# Patient Record
Sex: MALE | Employment: FULL TIME | URBAN - NONMETROPOLITAN AREA
[De-identification: names, ages, dates, MRNs, and addresses within clinical notes are randomized per-mention and may not be internally consistent; named-entity substitution may affect disease eponyms.]

---

## 2021-07-27 DIAGNOSIS — Z00.00 WELL ADULT EXAM: ICD-10-CM

## 2021-07-27 DIAGNOSIS — Z12.5 SCREENING FOR PROSTATE CANCER: ICD-10-CM

## 2021-07-27 LAB
ALBUMIN SERPL-MCNC: 4.4 G/DL (ref 3.5–5.2)
ALP BLD-CCNC: 51 U/L (ref 40–130)
ALT SERPL-CCNC: 22 U/L (ref 5–41)
ANION GAP SERPL CALCULATED.3IONS-SCNC: 10 MMOL/L (ref 7–19)
AST SERPL-CCNC: 19 U/L (ref 5–40)
BASOPHILS ABSOLUTE: 0.1 K/UL (ref 0–0.2)
BASOPHILS RELATIVE PERCENT: 0.6 % (ref 0–1)
BILIRUB SERPL-MCNC: 0.7 MG/DL (ref 0.2–1.2)
BUN BLDV-MCNC: 17 MG/DL (ref 6–20)
CALCIUM SERPL-MCNC: 9.5 MG/DL (ref 8.6–10)
CHLORIDE BLD-SCNC: 99 MMOL/L (ref 98–111)
CHOLESTEROL, FASTING: 159 MG/DL (ref 160–199)
CO2: 29 MMOL/L (ref 22–29)
CREAT SERPL-MCNC: 0.8 MG/DL (ref 0.5–1.2)
EOSINOPHILS ABSOLUTE: 0.5 K/UL (ref 0–0.6)
EOSINOPHILS RELATIVE PERCENT: 5.3 % (ref 0–5)
GFR AFRICAN AMERICAN: >59
GFR NON-AFRICAN AMERICAN: >60
GLUCOSE BLD-MCNC: 91 MG/DL (ref 74–109)
HCT VFR BLD CALC: 45.7 % (ref 42–52)
HDLC SERPL-MCNC: 44 MG/DL (ref 55–121)
HEMOGLOBIN: 15 G/DL (ref 14–18)
IMMATURE GRANULOCYTES #: 0.1 K/UL
LDL CHOLESTEROL CALCULATED: 78 MG/DL
LYMPHOCYTES ABSOLUTE: 2.1 K/UL (ref 1.1–4.5)
LYMPHOCYTES RELATIVE PERCENT: 24.6 % (ref 20–40)
MCH RBC QN AUTO: 29 PG (ref 27–31)
MCHC RBC AUTO-ENTMCNC: 32.8 G/DL (ref 33–37)
MCV RBC AUTO: 88.4 FL (ref 80–94)
MONOCYTES ABSOLUTE: 0.8 K/UL (ref 0–0.9)
MONOCYTES RELATIVE PERCENT: 9.5 % (ref 0–10)
NEUTROPHILS ABSOLUTE: 5 K/UL (ref 1.5–7.5)
NEUTROPHILS RELATIVE PERCENT: 58.6 % (ref 50–65)
PDW BLD-RTO: 13.4 % (ref 11.5–14.5)
PLATELET # BLD: 283 K/UL (ref 130–400)
PMV BLD AUTO: 10.9 FL (ref 9.4–12.4)
POTASSIUM SERPL-SCNC: 4.2 MMOL/L (ref 3.5–5)
PROSTATE SPECIFIC ANTIGEN: 1.29 NG/ML (ref 0–4)
RBC # BLD: 5.17 M/UL (ref 4.7–6.1)
SODIUM BLD-SCNC: 138 MMOL/L (ref 136–145)
T4 FREE: 1.02 NG/DL (ref 0.93–1.7)
TOTAL PROTEIN: 7.2 G/DL (ref 6.6–8.7)
TRIGLYCERIDE, FASTING: 185 MG/DL (ref 0–149)
TSH REFLEX FT4: 7.19 UIU/ML (ref 0.35–5.5)
WBC # BLD: 8.5 K/UL (ref 4.8–10.8)

## 2022-11-21 ENCOUNTER — OFFICE VISIT (OUTPATIENT)
Dept: PRIMARY CARE CLINIC | Age: 57
End: 2022-11-21
Payer: COMMERCIAL

## 2022-11-21 VITALS
TEMPERATURE: 101.7 F | BODY MASS INDEX: 32.16 KG/M2 | WEIGHT: 224.13 LBS | OXYGEN SATURATION: 97 % | DIASTOLIC BLOOD PRESSURE: 76 MMHG | SYSTOLIC BLOOD PRESSURE: 120 MMHG | HEART RATE: 110 BPM

## 2022-11-21 DIAGNOSIS — U07.1 COVID-19: Primary | ICD-10-CM

## 2022-11-21 DIAGNOSIS — R05.9 COUGH, UNSPECIFIED TYPE: ICD-10-CM

## 2022-11-21 LAB
INFLUENZA A ANTIBODY: NORMAL
INFLUENZA B ANTIBODY: NORMAL

## 2022-11-21 PROCEDURE — 99213 OFFICE O/P EST LOW 20 MIN: CPT | Performed by: NURSE PRACTITIONER

## 2022-11-21 PROCEDURE — 87804 INFLUENZA ASSAY W/OPTIC: CPT | Performed by: NURSE PRACTITIONER

## 2022-11-21 RX ORDER — ALBUTEROL SULFATE 90 UG/1
2 AEROSOL, METERED RESPIRATORY (INHALATION) 4 TIMES DAILY PRN
Qty: 18 G | Refills: 0 | Status: SHIPPED | OUTPATIENT
Start: 2022-11-21

## 2022-11-21 RX ORDER — BENZONATATE 100 MG/1
100 CAPSULE ORAL 3 TIMES DAILY PRN
Qty: 21 CAPSULE | Refills: 0 | Status: SHIPPED | OUTPATIENT
Start: 2022-11-21 | End: 2022-11-28

## 2022-11-21 RX ORDER — DEXTROMETHORPHAN HYDROBROMIDE AND PROMETHAZINE HYDROCHLORIDE 15; 6.25 MG/5ML; MG/5ML
5 SYRUP ORAL NIGHTLY
Qty: 35 ML | Refills: 0 | Status: SHIPPED | OUTPATIENT
Start: 2022-11-21 | End: 2022-11-28

## 2022-11-21 ASSESSMENT — ENCOUNTER SYMPTOMS
SHORTNESS OF BREATH: 0
COUGH: 1
EYE DISCHARGE: 0
COLOR CHANGE: 0
BLOOD IN STOOL: 0
CONSTIPATION: 0
RHINORRHEA: 0
WHEEZING: 0
SORE THROAT: 0
ABDOMINAL PAIN: 0
VOMITING: 0
SINUS PRESSURE: 0
EYE ITCHING: 0
NAUSEA: 0
DIARRHEA: 0

## 2022-11-21 NOTE — PATIENT INSTRUCTIONS
COVID-19 Information    What are the symptoms of COVID-19? -- Symptoms usually start 4 or 5 days after a person is infected with the virus. But in some people, it can take up to 2 weeks for symptoms to appear. Some people never show symptoms at all. When symptoms do happen, they can include:  ?Fever  ? Cough  ? Trouble breathing  ? Feeling tired  ? Shaking chills  ? Muscle aches  ? Headache  ? Sore throat  ? Runny or stuffy nose  ? Problems with sense of smell or taste    Some people have digestive problems like nausea or diarrhea. There have also been some reports of rashes or other skin symptoms. For example, some people with COVID-19 get reddish-purple spots on their fingers or toes. But it's not clear why or how often this happens. For most people, symptoms will get better within a few days to weeks. But a small number of people get very sick and stop being able to breathe on their own. In severe cases, their organs stop working, which can lead to death. Some people with COVID-19 continue to have some symptoms for weeks or months. This seems to be more likely in people who are sick enough to need to stay in the hospital. But this can also happen in people who did not get very sick. Doctors are still learning about the long-term effects of COVID-19. While children can get COVID-19, they are less likely than adults to have severe symptoms. How is COVID-19 spread? -- The virus that causes COVID-19 mainly spreads from person to person. This usually happens when an infected person coughs, sneezes, or talks near other people. The virus is passed through tiny particles from the infected person's lungs and airway. These particles can easily travel through the air to other people who are nearby. In some cases, like in indoor spaces where the same air keeps being blown around, virus in the particles might be able to spread to other people who are farther away.  The virus can be passed easily between people who live together. But it can also spread at gatherings where people are talking close together, shaking hands, hugging, sharing food, or even singing together. Eating at restaurants raises the risk of infection, since people tend to be close to each other and not covering their faces. Doctors also think it is possible to get infected if you touch a surface that has the virus on it and then touch your mouth, nose, or eyes. However, this is probably not very common. A person can be infected, and spread the virus to others, even without having any symptoms. Can COVID-19 be prevented? -- The best way to prevent COVID-19 is to get vaccinated. People age 11 and older can get a vaccine. People age 15 and older should also get a \"booster\" shot to give them extra protection. Experts believe that vaccines are one of the most important ways to control the COVID-19 pandemic. People who are fully vaccinated are at much lower risk of getting sick from the virus. If you are not yet vaccinated, there are other ways to help protect yourself and others:  ? Practice \"social distancing. \" It's most important to avoid contact with people who are sick. But social distancing also means staying at least 6 feet (about 2 meters) from anyone outside your household. That's because the virus can spread easily through close contact, and it's not always possible to know who is infected. ?Wear a face mask when you need to go be in public around other people. It might also help protect you from others who could be infected. Make sure your mask covers your mouth and nose. ? Wash your hands with soap and water often. This is especially important after being out in public or touching surfaces that many other people also touch, like door handles or railings. The risk of getting infected by touching items like this is probably not very high. Still, it's a good idea to wash your hands often.  This also helps protect you from other illnesses, like the flu or the common cold. ? Avoid touching your face, especially your mouth, nose, and eyes. What if I feel fine but think I was exposed? -- If you think you were in close contact with someone with COVID-19, what to do next depends on whether you are vaccinated. It also depends on how long ago you got the vaccine and whether you have had a booster shot. Although people who are fully vaccinated are less likely to get sick and infect others, it can still happen. This is why it's important to take steps to lower this risk. First, think about these questions:  ?Have you gotten a booster shot? ?Have you had 2 doses of the Pfizer or Moderna vaccine within the last 6 months? ?Have you had the [employer] Monday and [employer] Monday vaccine within the last 2 months? If you answered \"yes\" to any of the above questions, experts recommend doing the following after being exposed to someone with COVID-19:  ?You do not need to self-quarantine. But you should wear a mask around all other people for 10 days. ?If possible, get tested 5 days after the exposure: If your test is negative, continue to wear a mask around other people until 10 total days have passed  If your test is positive, stay home and \"self-isolate\" for at least 5 days  ? If you start to have symptoms at any point, stay home and get tested again    If you have not been vaccinated at all, or if you answered \"no\" to all of the above questions, experts recommend doing the following:  ?Self-quarantine for 5 days after the exposure. This means staying home and away from other people as much as possible. If you need to be around people, like in your home, wear a mask. ?If possible, get tested 5 days after the exposure: If your test is negative, continue to wear a mask around other people until 10 total days have passed  If your test is positive, continue to stay home and \"self-isolate\" for at least another 5 days  ? If you start to have symptoms at any point, stay home and get tested again  The guidance around what to do after being exposed has changed over time. That's because experts have learned more about the virus and when a person is most likely to infect others. If you are not sure whether you need to self-quarantine, or when you can go back to your normal activities, ask your doctor or nurse. What should I do if someone in my home has COVID-19? -- If someone in your home has COVID-19, there are additional things you can do to protect yourself and others:  ?Keep the sick person away from others - The sick person should stay in a separate room, and use a different bathroom if possible. They should also eat in their own room. Experts also recommend that the person stay away from pets in the house until they are better. ? Have them wear a mask - The sick person should wear a mask when they are in the same room as other people. If they can't wear a mask, you can help protect yourself by covering your face when you are in the room with them. ?Wash hands [employer] your hands with soap and water often. ? Clean often - Here are some specific things that can help:  Wear disposable gloves when you clean. It's also a good idea to wear gloves when you have to touch the sick person's laundry, dishes, utensils, or trash. Wash your hands after removing your gloves. Regularly clean things that are touched a lot. This includes counters, bedside tables, doorknobs, computers, phones, and bathroom surfaces. Clean things in your home with soap and water, but also use disinfectants on appropriate surfaces. Some cleaning products work well to kill bacteria, but not viruses, so it's important to check labels. How is COVID-19 treated? -- Many people will be able to stay home while they get better.  But people with serious symptoms or other health problems might need to go to the hospital.  ?Mild illness - Mild illness means you might have symptoms like fever and cough, but you do not have trouble breathing. Most people with COVID-19 have mild illness and can rest at home until they get better. This usually takes about 2 weeks, but it's not the same for everyone. If you are recovering from COVID-19, it's important to stay home and \"self-isolate\" while you are most likely to spread the virus. Self-isolation means staying apart from other people, even the people you live with. When you can stop self-isolation will depend on how long it has been since you had symptoms. If you are generally healthy and your symptoms are improving (or you don't have symptoms), experts recommend self-isolating for at least 5 days. The 5 days starts the day after you develop symptoms or get tested. After this, you should wear a mask around all other people for 5 more days. If you are not sure how long to self-isolate, or if you still have symptoms after 5 days, talk to your doctor or nurse. You should also check with your doctor or nurse if you have a weakened immune system. If you are at risk for getting seriously ill, doctors might recommend treatment even if you only have mild symptoms. This can lower your risk of getting sicker. Options might include pills that you take for a few days, a treatment called \"monoclonal antibodies\" that is given through an IV or as a shot, and another medicine that is given by IV. Doctors also might recommend being part of a clinical trial. This is a scientific study that tests new medicines to see how well they work. Do not try any new medicines or treatments without talking to a doctor. ? Severe illness - If you have more severe illness with trouble breathing, you might need to stay in the hospital, possibly in the intensive care unit (also called the \"ICU\"). While you are there, you will most likely be in a special isolation room. Only medical staff will be allowed in the room, and they will have to wear special gowns, gloves, masks, and eye protection.   The doctors and nurses can monitor and support your breathing and other body functions and make you as comfortable as possible. You might need extra oxygen to help you breathe easily. If you are having a very hard time breathing, you might need a breathing tube. The tube goes down your throat and into your lungs. It is connected to a machine to help you breathe, called a \"ventilator. \" You might also get medicines that have been shown to help some people with severe COVID-19. COVID Recommendations  Medications such as mucinex, flonase, and claritin may help with symptoms. Use tylenol/motrin as needed for body aches/fevers  To boost your immune system, it is recommended to take Vitamin B, C, D, and zinc.  Zinc  mg daily for 5 days and then decrease to 50 mg a day. Sometimes higher doses may cause nausea. Vitamin C 1000 mg a day  Vitamin D 20 - 25 mcg a day  Take Aspirin 81 mg unless you have an allergy to aspirin or you have stomach issues/bleeding disorders. Delsym is a great OTC cough suppressant. Drink plenty of water to stay hydrated. Get adequate rest. Try lying on your stomach intermittently to help you breathe easier. Get up and move around when you can. Perform deep breathing exercises  Check your oxygen saturation regularly and if below 90%, go to the ER. Go to the ER if you have severe SOA, chest pain, difficulty breathing, or you can not keep your fever below 102F with medications.

## 2022-11-21 NOTE — PROGRESS NOTES
[address] [facility] [address] [address]  Dept: [phone]  Dept Fax: [phone]  Loc: [phone]    Cathleen Khalil is a 56 y.o. male who presents today for his medical conditions/complaints as noted below. Cathleen Khlail is complaining of Congestion (Symptoms started yesterday), Cough, Fever, Generalized Body Aches, and Chills        HPI:   Cough  This is a new problem. The current episode started yesterday. The problem has been waxing and waning. The problem occurs every few minutes. The cough is Non-productive. Associated symptoms include chills, a fever and myalgias. Pertinent negatives include no chest pain, ear pain, headaches, rash, rhinorrhea, sore throat, shortness of breath or wheezing. The symptoms are aggravated by lying down. He has tried nothing for the symptoms. No known COVID or flu exposure recently. At home COVID test was positive    Past Medical History:   Diagnosis Date    Allergic rhinitis        Past Surgical History:   Procedure Laterality Date    BACK SURGERY      FOOT SURGERY Right     HERNIA REPAIR  2010    abdominal     KIDNEY STONE REMOVAL         No family history on file. Social History     Tobacco Use    Smoking status: Never    Smokeless tobacco: Never   Substance Use Topics    Alcohol use: Never        Current Outpatient Medications   Medication Sig Dispense Refill    loratadine (CLARITIN) 10 MG capsule Take 10 mg by mouth daily      promethazine-dextromethorphan (PROMETHAZINE-DM) 6.25-15 MG/5ML syrup Take 5 mLs by mouth nightly for 7 days 35 mL 0    benzonatate (TESSALON) 100 MG capsule Take 1 capsule by mouth 3 times daily as needed for Cough 21 capsule 0    albuterol sulfate HFA (VENTOLIN HFA) 108 (90 Base) MCG/ACT inhaler Inhale 2 puffs into the lungs 4 times daily as needed for Wheezing 18 g 0     No current facility-administered medications for this visit.        No Known Allergies    Health Maintenance   Topic Date Due    COVID-19 Vaccine (1) Never done    HIV screen  Never done    Hepatitis C screen  Never done    DTaP/Tdap/Td vaccine (1 - Tdap) Never done    Colorectal Cancer Screen  Never done    Shingles vaccine (1 of 2) Never done    Depression Screen  07/26/2022    Flu vaccine (1) Never done    Lipids  07/27/2026    Hepatitis A vaccine  Aged Out    Hib vaccine  Aged Out    Meningococcal (ACWY) vaccine  Aged Out    Pneumococcal 0-64 years Vaccine  Aged Out       Subjective:   Review of Systems   Constitutional:  Positive for chills, fatigue and fever. Negative for activity change and appetite change. HENT:  Positive for congestion. Negative for ear pain, rhinorrhea, sinus pressure and sore throat. Eyes:  Negative for discharge and itching. Respiratory:  Positive for cough. Negative for shortness of breath and wheezing. Cardiovascular:  Negative for chest pain. Gastrointestinal:  Negative for abdominal pain, blood in stool, constipation, diarrhea, nausea and vomiting. Musculoskeletal:  Positive for myalgias. Skin:  Negative for color change and rash. Neurological:  Negative for dizziness and headaches. All other systems reviewed and are negative. Objective    Physical Exam  Vitals and nursing note reviewed. Constitutional:       General: He is not in acute distress. Appearance: Normal appearance. HENT:      Head: Normocephalic and atraumatic. Right Ear: Tympanic membrane and ear canal normal.      Left Ear: Tympanic membrane and ear canal normal.      Mouth/Throat:      Mouth: Mucous membranes are moist.      Pharynx: No posterior oropharyngeal erythema. Comments: Post nasal drip noted    Eyes:      Extraocular Movements: Extraocular movements intact. Pupils: Pupils are equal, round, and reactive to light. Cardiovascular:      Rate and Rhythm: Normal rate and regular rhythm. Pulses: Normal pulses. Heart sounds: Normal heart sounds.  No murmur heard.  Pulmonary:      Effort: Pulmonary effort is normal. No respiratory distress. Breath sounds: Wheezing (intermittent, mild) present. Skin:     General: Skin is warm. Capillary Refill: Capillary refill takes less than 2 seconds. Coloration: Skin is not pale. Findings: No rash. Neurological:      General: No focal deficit present. Mental Status: He is alert and oriented to person, place, and time. Deep Tendon Reflexes: Reflexes are normal and symmetric. Psychiatric:         Attention and Perception: Attention normal.         Mood and Affect: Mood normal.         Behavior: Behavior normal. Behavior is cooperative. Thought Content: Thought content normal.       /76   Pulse (!) 110   Temp (!) 101.7 °F (38.7 °C)   Wt 224 lb 2 oz (101.7 kg)   SpO2 97%   BMI 32.16 kg/m²     Assessment         Diagnosis Orders   1. COVID-19        2. Cough, unspecified type  POCT Influenza A/B          Plan   CDC quarantine guidelines discussed  Medications such as zyrtec or claritin may help with symptoms. Use benzonatate as needed for cough during the day and promethazine-DM as needed at night  Use albuterol inhaler as needed for wheezing  Use tylenol/motrin as needed for body aches/fevers unless contraindicated  Multivitamin is recommended to help boost the immune system  Drink plenty of water to stay hydrated. May use humidifier as needed. Allow adequate rest.  Encourage deep breathing exercises  Go to the ER if you develop severe SOA, chest pain, difficulty breathing, decreased urine output or signs of dehydration, severe abdominal pain or vomiting, or you can not keep the fever below 102F with medications.     Orders Placed This Encounter   Procedures    POCT Influenza A/B       Results for orders placed or performed in visit on 11/21/22   POCT Influenza A/B   Result Value Ref Range    Influenza A Ab neg     Influenza B Ab neg        Orders Placed This Encounter Medications    promethazine-dextromethorphan (PROMETHAZINE-DM) 6.25-15 MG/5ML syrup     Sig: Take 5 mLs by mouth nightly for 7 days     Dispense:  35 mL     Refill:  0    benzonatate (TESSALON) 100 MG capsule     Sig: Take 1 capsule by mouth 3 times daily as needed for Cough     Dispense:  21 capsule     Refill:  0    albuterol sulfate HFA (VENTOLIN HFA) 108 (90 Base) MCG/ACT inhaler     Sig: Inhale 2 puffs into the lungs 4 times daily as needed for Wheezing     Dispense:  18 g     Refill:  0      New Prescriptions    ALBUTEROL SULFATE HFA (VENTOLIN HFA) 108 (90 BASE) MCG/ACT INHALER    Inhale 2 puffs into the lungs 4 times daily as needed for Wheezing    BENZONATATE (TESSALON) 100 MG CAPSULE    Take 1 capsule by mouth 3 times daily as needed for Cough    PROMETHAZINE-DEXTROMETHORPHAN (PROMETHAZINE-DM) 6.25-15 MG/5ML SYRUP    Take 5 mLs by mouth nightly for 7 days        Return if symptoms worsen or fail to improve. Discussed use, benefits, and side effects of any prescribed medications. All patient questions were answered. Patient voiced understanding of care plan. Patient was given educational materials - see patient instructions below. Patient Instructions   COVID-19 Information    What are the symptoms of COVID-19? -- Symptoms usually start 4 or 5 days after a person is infected with the virus. But in some people, it can take up to 2 weeks for symptoms to appear. Some people never show symptoms at all. When symptoms do happen, they can include:  ?Fever  ? Cough  ? Trouble breathing  ? Feeling tired  ? Shaking chills  ? Muscle aches  ? Headache  ? Sore throat  ? Runny or stuffy nose  ? Problems with sense of smell or taste    Some people have digestive problems like nausea or diarrhea. There have also been some reports of rashes or other skin symptoms. For example, some people with COVID-19 get reddish-purple spots on their fingers or toes. But it's not clear why or how often this happens.  For most people, symptoms will get better within a few days to weeks. But a small number of people get very sick and stop being able to breathe on their own. In severe cases, their organs stop working, which can lead to death. Some people with COVID-19 continue to have some symptoms for weeks or months. This seems to be more likely in people who are sick enough to need to stay in the hospital. But this can also happen in people who did not get very sick. Doctors are still learning about the long-term effects of COVID-19. While children can get COVID-19, they are less likely than adults to have severe symptoms. How is COVID-19 spread? -- The virus that causes COVID-19 mainly spreads from person to person. This usually happens when an infected person coughs, sneezes, or talks near other people. The virus is passed through tiny particles from the infected person's lungs and airway. These particles can easily travel through the air to other people who are nearby. In some cases, like in indoor spaces where the same air keeps being blown around, virus in the particles might be able to spread to other people who are farther away. The virus can be passed easily between people who live together. But it can also spread at gatherings where people are talking close together, shaking hands, hugging, sharing food, or even singing together. Eating at restaurants raises the risk of infection, since people tend to be close to each other and not covering their faces. Doctors also think it is possible to get infected if you touch a surface that has the virus on it and then touch your mouth, nose, or eyes. However, this is probably not very common. A person can be infected, and spread the virus to others, even without having any symptoms. Can COVID-19 be prevented? -- The best way to prevent COVID-19 is to get vaccinated. People age 11 and older can get a vaccine.  People age 15 and older should also get a \"booster\" shot to give them extra protection. Experts believe that vaccines are one of the most important ways to control the COVID-19 pandemic. People who are fully vaccinated are at much lower risk of getting sick from the virus. If you are not yet vaccinated, there are other ways to help protect yourself and others:  ? Practice \"social distancing. \" It's most important to avoid contact with people who are sick. But social distancing also means staying at least 6 feet (about 2 meters) from anyone outside your household. That's because the virus can spread easily through close contact, and it's not always possible to know who is infected. ?Wear a face mask when you need to go be in public around other people. It might also help protect you from others who could be infected. Make sure your mask covers your mouth and nose. ? Wash your hands with soap and water often. This is especially important after being out in public or touching surfaces that many other people also touch, like door handles or railings. The risk of getting infected by touching items like this is probably not very high. Still, it's a good idea to wash your hands often. This also helps protect you from other illnesses, like the flu or the common cold. ? Avoid touching your face, especially your mouth, nose, and eyes. What if I feel fine but think I was exposed? -- If you think you were in close contact with someone with COVID-19, what to do next depends on whether you are vaccinated. It also depends on how long ago you got the vaccine and whether you have had a booster shot. Although people who are fully vaccinated are less likely to get sick and infect others, it can still happen. This is why it's important to take steps to lower this risk. First, think about these questions:  ?Have you gotten a booster shot? ?Have you had 2 doses of the Pfizer or Moderna vaccine within the last 6 months? ?Have you had the Sean Minus and Sean Minus vaccine within the last 2 months?     If you answered \"yes\" to any of the above questions, experts recommend doing the following after being exposed to someone with COVID-19:  ?You do not need to self-quarantine. But you should wear a mask around all other people for 10 days. ?If possible, get tested 5 days after the exposure: If your test is negative, continue to wear a mask around other people until 10 total days have passed  If your test is positive, stay home and \"self-isolate\" for at least 5 days  ? If you start to have symptoms at any point, stay home and get tested again    If you have not been vaccinated at all, or if you answered \"no\" to all of the above questions, experts recommend doing the following:  ?Self-quarantine for 5 days after the exposure. This means staying home and away from other people as much as possible. If you need to be around people, like in your home, wear a mask. ?If possible, get tested 5 days after the exposure: If your test is negative, continue to wear a mask around other people until 10 total days have passed  If your test is positive, continue to stay home and \"self-isolate\" for at least another 5 days  ? If you start to have symptoms at any point, stay home and get tested again  The guidance around what to do after being exposed has changed over time. That's because experts have learned more about the virus and when a person is most likely to infect others. If you are not sure whether you need to self-quarantine, or when you can go back to your normal activities, ask your doctor or nurse. What should I do if someone in my home has COVID-19? -- If someone in your home has COVID-19, there are additional things you can do to protect yourself and others:  ?Keep the sick person away from others - The sick person should stay in a separate room, and use a different bathroom if possible. They should also eat in their own room.   Experts also recommend that the person stay away from pets in the house until they are better. ? Have them wear a mask - The sick person should wear a mask when they are in the same room as other people. If they can't wear a mask, you can help protect yourself by covering your face when you are in the room with them. ?Wash hands [employer] your hands with soap and water often. ? Clean often - Here are some specific things that can help:  Wear disposable gloves when you clean. It's also a good idea to wear gloves when you have to touch the sick person's laundry, dishes, utensils, or trash. Wash your hands after removing your gloves. Regularly clean things that are touched a lot. This includes counters, bedside tables, doorknobs, computers, phones, and bathroom surfaces. Clean things in your home with soap and water, but also use disinfectants on appropriate surfaces. Some cleaning products work well to kill bacteria, but not viruses, so it's important to check labels. How is COVID-19 treated? -- Many people will be able to stay home while they get better. But people with serious symptoms or other health problems might need to go to the hospital.  ?Mild illness - Mild illness means you might have symptoms like fever and cough, but you do not have trouble breathing. Most people with COVID-19 have mild illness and can rest at home until they get better. This usually takes about 2 weeks, but it's not the same for everyone. If you are recovering from COVID-19, it's important to stay home and \"self-isolate\" while you are most likely to spread the virus. Self-isolation means staying apart from other people, even the people you live with. When you can stop self-isolation will depend on how long it has been since you had symptoms. If you are generally healthy and your symptoms are improving (or you don't have symptoms), experts recommend self-isolating for at least 5 days. The 5 days starts the day after you develop symptoms or get tested.  After this, you should wear a mask around all other people for 5 more days. If you are not sure how long to self-isolate, or if you still have symptoms after 5 days, talk to your doctor or nurse. You should also check with your doctor or nurse if you have a weakened immune system. If you are at risk for getting seriously ill, doctors might recommend treatment even if you only have mild symptoms. This can lower your risk of getting sicker. Options might include pills that you take for a few days, a treatment called \"monoclonal antibodies\" that is given through an IV or as a shot, and another medicine that is given by IV. Doctors also might recommend being part of a clinical trial. This is a scientific study that tests new medicines to see how well they work. Do not try any new medicines or treatments without talking to a doctor. ? Severe illness - If you have more severe illness with trouble breathing, you might need to stay in the hospital, possibly in the intensive care unit (also called the \"ICU\"). While you are there, you will most likely be in a special isolation room. Only medical staff will be allowed in the room, and they will have to wear special gowns, gloves, masks, and eye protection. The doctors and nurses can monitor and support your breathing and other body functions and make you as comfortable as possible. You might need extra oxygen to help you breathe easily. If you are having a very hard time breathing, you might need a breathing tube. The tube goes down your throat and into your lungs. It is connected to a machine to help you breathe, called a \"ventilator. \" You might also get medicines that have been shown to help some people with severe COVID-19. COVID Recommendations  Medications such as mucinex, flonase, and claritin may help with symptoms. Use tylenol/motrin as needed for body aches/fevers  To boost your immune system, it is recommended to take Vitamin B, C, D, and zinc.  Zinc  mg daily for 5 days and then decrease to 50 mg a day.  Sometimes higher doses may cause nausea. Vitamin C 1000 mg a day  Vitamin D 20 - 25 mcg a day  Take Aspirin 81 mg unless you have an allergy to aspirin or you have stomach issues/bleeding disorders. Delsym is a great OTC cough suppressant. Drink plenty of water to stay hydrated. Get adequate rest. Try lying on your stomach intermittently to help you breathe easier. Get up and move around when you can. Perform deep breathing exercises  Check your oxygen saturation regularly and if below 90%, go to the ER. Go to the ER if you have severe SOA, chest pain, difficulty breathing, or you can not keep your fever below 102F with medications.          Electronically signed by MIKE Merlos CNP on 11/21/2022 at 1:53 PM

## 2022-11-21 NOTE — LETTER
[facility] ([facility]) [facility] [address]  Phone: [phone]  Fax: [phone]    MIKE Joy CNP        November 21, 2022     Patient: Mauri Blevins   YOB: 1965   Date of Visit: 11/21/2022       To Whom it May Concern: Mauri Blevins was seen in my clinic on 11/21/2022. He may return to work on 11/28/2022. If you have any questions or concerns, please don't hesitate to call.     Sincerely,         MIKE Joy CNP

## 2022-12-30 ENCOUNTER — OFFICE VISIT (OUTPATIENT)
Dept: PRIMARY CARE CLINIC | Age: 57
End: 2022-12-30

## 2022-12-30 VITALS
HEART RATE: 72 BPM | SYSTOLIC BLOOD PRESSURE: 116 MMHG | DIASTOLIC BLOOD PRESSURE: 72 MMHG | RESPIRATION RATE: 22 BRPM | WEIGHT: 228 LBS | OXYGEN SATURATION: 95 % | TEMPERATURE: 98.1 F | BODY MASS INDEX: 32.71 KG/M2

## 2022-12-30 DIAGNOSIS — R06.02 SOB (SHORTNESS OF BREATH): ICD-10-CM

## 2022-12-30 DIAGNOSIS — R06.2 WHEEZING: ICD-10-CM

## 2022-12-30 DIAGNOSIS — J06.9 URI WITH COUGH AND CONGESTION: Primary | ICD-10-CM

## 2022-12-30 RX ORDER — AZITHROMYCIN 250 MG/1
250 TABLET, FILM COATED ORAL SEE ADMIN INSTRUCTIONS
Qty: 6 TABLET | Refills: 0 | Status: SHIPPED | OUTPATIENT
Start: 2022-12-30 | End: 2023-01-04

## 2022-12-30 RX ORDER — METHYLPREDNISOLONE 4 MG/1
TABLET ORAL
Qty: 1 KIT | Refills: 0 | Status: SHIPPED | OUTPATIENT
Start: 2022-12-30

## 2022-12-30 RX ORDER — DEXAMETHASONE SODIUM PHOSPHATE 10 MG/ML
10 INJECTION INTRAMUSCULAR; INTRAVENOUS ONCE
Status: COMPLETED | OUTPATIENT
Start: 2022-12-30 | End: 2022-12-30

## 2022-12-30 RX ORDER — AMOXICILLIN AND CLAVULANATE POTASSIUM 875; 125 MG/1; MG/1
1 TABLET, FILM COATED ORAL 2 TIMES DAILY
Qty: 20 TABLET | Refills: 0 | Status: SHIPPED | OUTPATIENT
Start: 2022-12-30 | End: 2023-01-09

## 2022-12-30 RX ADMIN — DEXAMETHASONE SODIUM PHOSPHATE 10 MG: 10 INJECTION INTRAMUSCULAR; INTRAVENOUS at 17:40

## 2022-12-30 ASSESSMENT — ENCOUNTER SYMPTOMS
SINUS PAIN: 1
ALLERGIC/IMMUNOLOGIC NEGATIVE: 1
GASTROINTESTINAL NEGATIVE: 1
COUGH: 1
SINUS PRESSURE: 1
EYES NEGATIVE: 1
SHORTNESS OF BREATH: 1
WHEEZING: 1

## 2022-12-30 NOTE — PROGRESS NOTES
Rc Geller (:  1965) is a 56 y.o. male,Established patient, here for evaluation of the following chief complaint(s):  Chest Congestion (Chest congestion for 2 weeks . Had Covid Thanksgiving . Felt good for about 1 week . Now wheezing in chest , cough congestion ) and Nasal Congestion    Patient presents today complaining of cough, chest congestion, wheezing, shortness of breath, sinus congestion. Patient was positive for COVID-19 on 2022. He [facility] he felt better for about 1 week, but then started coughing again and in the past 2 days has developed the wheezing and shortness of breath. Denies fever, body aches or chills, GI symptoms. He takes Claritin daily, and has been using his inhaler, and taking Mucinex. He states he has Flonase nasal spray at home. Discussed with patient that due to the amount of wheezing and his shortness of breath I am concerned about pneumonia. Chest x-ray ordered. We will treat with Augmentin and azithromycin to cover for pneumonia. He will receive a steroid injection in office and is to start a steroid pack tomorrow. He is to continue taking Mucinex, Claritin and start Flonase. Discussed with patient the need to go to the emergency department if he becomes extremely short of breath. Care instructions discussed. Patient verbalized understanding and agrees to plan of care. ASSESSMENT/PLAN:  1. URI with cough and congestion  -     azithromycin (ZITHROMAX) 250 MG tablet; Take 1 tablet by mouth See Admin Instructions for 5 days 500mg on day 1 followed by 250mg on days 2 - 5, Disp-6 tablet, R-0Normal  -     XR CHEST STANDARD (2 VW); Future  2. Wheezing  -     XR CHEST STANDARD (2 VW); Future  3. SOB (shortness of breath)  -     XR CHEST STANDARD (2 VW);  Future   Orders Placed This Encounter   Medications    dexamethasone (DECADRON) injection 10 mg    amoxicillin-clavulanate (AUGMENTIN) 875-125 MG per tablet     Sig: Take 1 tablet by mouth 2 times daily for 10 days     Dispense:  20 tablet     Refill:  0    azithromycin (ZITHROMAX) 250 MG tablet     Sig: Take 1 tablet by mouth See Admin Instructions for 5 days 500mg on day 1 followed by 250mg on days 2 - 5     Dispense:  6 tablet     Refill:  0    methylPREDNISolone (MEDROL DOSEPACK) 4 MG tablet     Sig: Take by mouth. Dispense:  1 kit     Refill:  0        Return if symptoms worsen or fail to improve. Subjective   SUBJECTIVE/OBJECTIVE:  Chest Congestion  Associated symptoms include congestion and coughing. Review of Systems   Constitutional: Negative. HENT:  Positive for congestion, postnasal drip, sinus pressure and sinus pain. Eyes: Negative. Respiratory:  Positive for cough, shortness of breath and wheezing. Cardiovascular: Negative. Gastrointestinal: Negative. Endocrine: Negative. Genitourinary: Negative. Musculoskeletal: Negative. Skin: Negative. Allergic/Immunologic: Negative. Neurological: Negative. Hematological: Negative. Psychiatric/Behavioral: Negative. Objective   Physical Exam  Vitals reviewed. HENT:      Right Ear: Tympanic membrane, ear canal and external ear normal.      Left Ear: Tympanic membrane, ear canal and external ear normal.      Nose:      Right Sinus: No maxillary sinus tenderness or frontal sinus tenderness. Left Sinus: No maxillary sinus tenderness or frontal sinus tenderness. Mouth/Throat:      Lips: Pink. Mouth: Mucous membranes are moist.      Pharynx: Posterior oropharyngeal erythema (postnasal drainage) present. No oropharyngeal exudate. Cardiovascular:      Rate and Rhythm: Normal rate and regular rhythm. Heart sounds: Normal heart sounds. Pulmonary:      Effort: Pulmonary effort is normal.      Breath sounds: Examination of the right-upper field reveals wheezing and rhonchi. Examination of the left-upper field reveals wheezing.  Examination of the right-middle field reveals wheezing. Examination of the left-middle field reveals wheezing. Examination of the right-lower field reveals wheezing. Examination of the left-lower field reveals wheezing. Wheezing and rhonchi present. Comments: Patient can be heard wheezing while sitting in room. Lymphadenopathy:      Head:      Right side of head: No submandibular or tonsillar adenopathy. Left side of head: No submandibular or tonsillar adenopathy. Cervical:      Right cervical: No superficial cervical adenopathy. Left cervical: No superficial cervical adenopathy. Skin:     General: Skin is warm and dry. Neurological:      Mental Status: He is alert. Patient Instructions     Completely finish both antibiotics. Start steroid pack tomorrow. Continue Mucinex for congestion. Continue Claritin daily. Start Flonase nasal spray daily. Cool-mist humidifier in bedroom. Use your inhaler as needed for wheezing or shortness of breath. If you develop severe shortness of breath go to the emergency department for evaluation and treatment. You will receive a phone call regarding your chest x-ray results. If a different treatment is needed you will be notified at that time. Follow-up with your PCP if symptoms persist or worsen. An electronic signature was used to authenticate this note. --MIKE Lux - CNP     EMR Dragon/translation disclaimer: Much of this encounter note is an electronic transcription/translation of spoken language to printed text. The electronic translation of spoken language may be erroneous, or at times, nonsensical words or phrases may be inadvertently transcribed.   Although I have reviewed the note for such errors, some may still exist.

## 2022-12-30 NOTE — PATIENT INSTRUCTIONS
Completely finish both antibiotics. Start steroid pack tomorrow. Continue Mucinex for congestion. Continue Claritin daily. Start Flonase nasal spray daily. Cool-mist humidifier in bedroom. Use your inhaler as needed for wheezing or shortness of breath. If you develop severe shortness of breath go to the emergency department for evaluation and treatment. You will receive a phone call regarding your chest x-ray results. If a different treatment is needed you will be notified at that time. Follow-up with your PCP if symptoms persist or worsen.

## 2023-01-30 ENCOUNTER — OFFICE VISIT (OUTPATIENT)
Dept: PRIMARY CARE CLINIC | Age: 58
End: 2023-01-30
Payer: COMMERCIAL

## 2023-01-30 VITALS
SYSTOLIC BLOOD PRESSURE: 102 MMHG | OXYGEN SATURATION: 100 % | HEART RATE: 74 BPM | TEMPERATURE: 98 F | WEIGHT: 226.8 LBS | BODY MASS INDEX: 32.54 KG/M2 | DIASTOLIC BLOOD PRESSURE: 70 MMHG

## 2023-01-30 DIAGNOSIS — J01.00 ACUTE NON-RECURRENT MAXILLARY SINUSITIS: Primary | ICD-10-CM

## 2023-01-30 PROCEDURE — 99213 OFFICE O/P EST LOW 20 MIN: CPT | Performed by: NURSE PRACTITIONER

## 2023-01-30 PROCEDURE — 96372 THER/PROPH/DIAG INJ SC/IM: CPT | Performed by: NURSE PRACTITIONER

## 2023-01-30 RX ORDER — DEXAMETHASONE SODIUM PHOSPHATE 10 MG/ML
10 INJECTION INTRAMUSCULAR; INTRAVENOUS ONCE
Status: COMPLETED | OUTPATIENT
Start: 2023-01-30 | End: 2023-01-30

## 2023-01-30 RX ORDER — BENZONATATE 200 MG/1
200 CAPSULE ORAL 3 TIMES DAILY PRN
Qty: 30 CAPSULE | Refills: 0 | Status: SHIPPED | OUTPATIENT
Start: 2023-01-30 | End: 2023-02-06

## 2023-01-30 RX ORDER — CEFDINIR 300 MG/1
300 CAPSULE ORAL 2 TIMES DAILY
Qty: 20 CAPSULE | Refills: 0 | Status: SHIPPED | OUTPATIENT
Start: 2023-01-30 | End: 2023-02-09

## 2023-01-30 RX ORDER — METHYLPREDNISOLONE 4 MG/1
TABLET ORAL
Qty: 1 KIT | Refills: 0 | Status: SHIPPED | OUTPATIENT
Start: 2023-01-30

## 2023-01-30 RX ADMIN — DEXAMETHASONE SODIUM PHOSPHATE 10 MG: 10 INJECTION INTRAMUSCULAR; INTRAVENOUS at 14:06

## 2023-01-30 ASSESSMENT — ENCOUNTER SYMPTOMS
CONSTIPATION: 0
NAUSEA: 0
VOMITING: 0
SINUS PRESSURE: 1
BLOOD IN STOOL: 0
WHEEZING: 1
COLOR CHANGE: 0
DIARRHEA: 0
EYE ITCHING: 0
SHORTNESS OF BREATH: 0
SORE THROAT: 0
COUGH: 1
RHINORRHEA: 0
EYE DISCHARGE: 0
ABDOMINAL PAIN: 0

## 2023-01-30 NOTE — PROGRESS NOTES
[facility]  [facility] [address]  [address]  [place] [address]  Dept: [phone]  Dept Fax: [phone]  Loc: [phone]    Skip Wei is a 57 y.o. male who presents today for his medical conditions/complaints as noted below.  Skip Wei is complaining of Cough (Symptoms started about a week ago and getting worse) and Congestion (Chest and nasal)        HPI:   Cough  This is a new problem. The current episode started in the past 7 days. The problem has been gradually worsening. The problem occurs hourly. The cough is Non-productive. Associated symptoms include nasal congestion and wheezing (at night). Pertinent negatives include no chest pain, chills, ear pain, fever, headaches, myalgias, rash, rhinorrhea, sore throat or shortness of breath. Nothing aggravates the symptoms. Treatments tried: claritin. The treatment provided mild relief.   No known COVID or flu exposure recently    Past Medical History:   Diagnosis Date    Allergic rhinitis        Past Surgical History:   Procedure Laterality Date    BACK SURGERY      FOOT SURGERY Right     HERNIA REPAIR  2010    abdominal     KIDNEY STONE REMOVAL         No family history on file.    Social History     Tobacco Use    Smoking status: Never    Smokeless tobacco: Never   Substance Use Topics    Alcohol use: Never        Current Outpatient Medications   Medication Sig Dispense Refill    methylPREDNISolone (MEDROL DOSEPACK) 4 MG tablet Take by mouth. 1 kit 0    cefdinir (OMNICEF) 300 MG capsule Take 1 capsule by mouth 2 times daily for 10 days 20 capsule 0    benzonatate (TESSALON) 200 MG capsule Take 1 capsule by mouth 3 times daily as needed for Cough 30 capsule 0    albuterol sulfate HFA (VENTOLIN HFA) 108 (90 Base) MCG/ACT inhaler Inhale 2 puffs into the lungs 4 times daily as needed for Wheezing 18 g 0    loratadine (CLARITIN) 10 MG capsule Take 10 mg by mouth daily       Current Facility-Administered  Medications   Medication Dose Route Frequency Provider Last Rate Last Admin    dexamethasone (DECADRON) injection 10 mg  10 mg IntraMUSCular Once Oskar Lilaine, APRN - CNP           No Known Allergies    Health Maintenance   Topic Date Due    COVID-19 Vaccine (1) Never done    HIV screen  Never done    Hepatitis C screen  Never done    DTaP/Tdap/Td vaccine (1 - Tdap) Never done    Colorectal Cancer Screen  Never done    Shingles vaccine (1 of 2) Never done    Depression Screen  07/26/2022    Flu vaccine (1) Never done    Lipids  07/27/2026    Hepatitis A vaccine  Aged Out    Hib vaccine  Aged Out    Meningococcal (ACWY) vaccine  Aged Out    Pneumococcal 0-64 years Vaccine  Aged Out       Subjective:   Review of Systems   Constitutional:  Negative for activity change, appetite change, chills, fatigue and fever. HENT:  Positive for congestion and sinus pressure. Negative for ear pain, rhinorrhea and sore throat. Eyes:  Negative for discharge and itching. Respiratory:  Positive for cough and wheezing (at night). Negative for shortness of breath. Cardiovascular:  Negative for chest pain. Gastrointestinal:  Negative for abdominal pain, blood in stool, constipation, diarrhea, nausea and vomiting. Musculoskeletal:  Negative for myalgias. Skin:  Negative for color change and rash. Neurological:  Negative for dizziness and headaches. All other systems reviewed and are negative. Objective    Physical Exam  Constitutional:       General: He is not in acute distress. Appearance: Normal appearance. He is not diaphoretic. HENT:      Head: Normocephalic and atraumatic. Right Ear: Ear canal normal. A middle ear effusion is present. Left Ear: Ear canal normal. A middle ear effusion is present. Nose: Congestion present. Right Turbinates: Swollen. Left Turbinates: Swollen. Right Sinus: Maxillary sinus tenderness present. Left Sinus: Maxillary sinus tenderness present. Mouth/Throat:      Mouth: Mucous membranes are moist.      Pharynx: No posterior oropharyngeal erythema. Comments: Post nasal drip noted  Eyes:      Extraocular Movements: Extraocular movements intact. Pupils: Pupils are equal, round, and reactive to light. Cardiovascular:      Rate and Rhythm: Normal rate and regular rhythm. Pulses: Normal pulses. Heart sounds: No murmur heard. Pulmonary:      Effort: Pulmonary effort is normal. No respiratory distress. Breath sounds: Normal breath sounds. No stridor. No wheezing, rhonchi or rales. Skin:     General: Skin is warm and dry. Capillary Refill: Capillary refill takes less than 2 seconds. Coloration: Skin is not jaundiced. Findings: No bruising, erythema or rash. Neurological:      General: No focal deficit present. Mental Status: He is alert and oriented to person, place, and time. Psychiatric:         Mood and Affect: Mood normal.         Behavior: Behavior normal. Behavior is cooperative. Thought Content: Thought content normal.       /70 (Site: Right Upper Arm, Position: Sitting, Cuff Size: Medium Adult)   Pulse 74   Temp 98 °F (36.7 °C) (Temporal)   Wt 226 lb 12.8 oz (102.9 kg)   SpO2 100%   BMI 32.54 kg/m²     Assessment         Diagnosis Orders   1. Acute non-recurrent maxillary sinusitis            Plan   - Take full course of antibiotics  - Take medrol as directed beginning tomorrow  - Dexamethasone injection today in office  - Increase fluid intake  - Recommended OTC mucinex   - May use OTC claritin/zyrtec and nasal spray such as flonase to help symptoms  - If patient is not improving or developing any new/worsening symptoms then return to clinic or f/u with PCP    Orders Placed This Encounter   Medications    methylPREDNISolone (MEDROL DOSEPACK) 4 MG tablet     Sig: Take by mouth.      Dispense:  1 kit     Refill:  0    cefdinir (OMNICEF) 300 MG capsule     Sig: Take 1 capsule by mouth 2 times daily for 10 days     Dispense:  20 capsule     Refill:  0    benzonatate (TESSALON) 200 MG capsule     Sig: Take 1 capsule by mouth 3 times daily as needed for Cough     Dispense:  30 capsule     Refill:  0    dexamethasone (DECADRON) injection 10 mg        New Prescriptions    BENZONATATE (TESSALON) 200 MG CAPSULE    Take 1 capsule by mouth 3 times daily as needed for Cough    CEFDINIR (OMNICEF) 300 MG CAPSULE    Take 1 capsule by mouth 2 times daily for 10 days    METHYLPREDNISOLONE (MEDROL DOSEPACK) 4 MG TABLET    Take by mouth. Return if symptoms worsen or fail to improve. Discussed use, benefits, and side effects of any prescribed medications. All patient questions were answered. Patient voiced understanding of care plan. Patient was given educational materials - see patient instructions below.      Patient Instructions   - Take full course of antibiotics  - Take medrol as directed  - Increase fluid intake  - Recommended OTC mucinex   - May use OTC claritin/zyrtec and nasal spray such as flonase to help symptoms  - If patient is not improving or developing any new/worsening symptoms then return to clinic or f/u with PCP       Electronically signed by MIKE Hogue CNP on 1/30/2023 at 1:55 PM

## 2023-01-30 NOTE — PATIENT INSTRUCTIONS
- Take full course of antibiotics  - Take medrol as directed  - Increase fluid intake  - Recommended OTC mucinex   - May use OTC claritin/zyrtec and nasal spray such as flonase to help symptoms  - If patient is not improving or developing any new/worsening symptoms then return to clinic or f/u with PCP

## 2023-01-30 NOTE — LETTER
[facility] ([facility]) [facility] [place] [address] [address] [place] [address]  Phone: [phone]  Fax: [phone]    MIKE Saucedo CNP        January 30, 2023     Patient: Jackie Krishnan   YOB: 1965   Date of Visit: 1/30/2023       To Whom it May Concern: Jackie Krishnan was seen in my clinic on 1/30/2023. He may return to work on 1/31/2023. If you have any questions or concerns, please don't hesitate to call.     Sincerely,         MIKE Saucedo CNP

## 2023-08-17 ENCOUNTER — OFFICE VISIT (OUTPATIENT)
Dept: FAMILY MEDICINE CLINIC | Age: 58
End: 2023-08-17
Payer: COMMERCIAL

## 2023-08-17 VITALS
BODY MASS INDEX: 29.56 KG/M2 | OXYGEN SATURATION: 97 % | DIASTOLIC BLOOD PRESSURE: 82 MMHG | WEIGHT: 206 LBS | TEMPERATURE: 98.4 F | SYSTOLIC BLOOD PRESSURE: 126 MMHG | HEART RATE: 59 BPM | RESPIRATION RATE: 17 BRPM

## 2023-08-17 DIAGNOSIS — R10.13 EPIGASTRIC PAIN: Primary | ICD-10-CM

## 2023-08-17 DIAGNOSIS — K27.9 PUD (PEPTIC ULCER DISEASE): ICD-10-CM

## 2023-08-17 DIAGNOSIS — R11.2 INTRACTABLE NAUSEA AND VOMITING: ICD-10-CM

## 2023-08-17 PROCEDURE — 99214 OFFICE O/P EST MOD 30 MIN: CPT | Performed by: CLINICAL NURSE SPECIALIST

## 2023-08-17 RX ORDER — SUCRALFATE 1 G/1
1 TABLET ORAL 4 TIMES DAILY
Qty: 120 TABLET | Refills: 0 | Status: SHIPPED | OUTPATIENT
Start: 2023-08-17

## 2023-08-17 RX ORDER — ONDANSETRON 4 MG/1
4 TABLET, ORALLY DISINTEGRATING ORAL 3 TIMES DAILY PRN
Qty: 30 TABLET | Refills: 0 | Status: SHIPPED | OUTPATIENT
Start: 2023-08-17

## 2023-08-17 RX ORDER — PANTOPRAZOLE SODIUM 40 MG/1
40 TABLET, DELAYED RELEASE ORAL
Qty: 90 TABLET | Refills: 0 | Status: SHIPPED | OUTPATIENT
Start: 2023-08-17

## 2023-08-17 ASSESSMENT — ENCOUNTER SYMPTOMS
VOMITING: 1
TROUBLE SWALLOWING: 0
WHEEZING: 0
DIARRHEA: 0
ABDOMINAL PAIN: 1
COLOR CHANGE: 0
SHORTNESS OF BREATH: 0
NAUSEA: 1
ANAL BLEEDING: 0
CHEST TIGHTNESS: 0
SORE THROAT: 0
COUGH: 0
BLOOD IN STOOL: 0
CONSTIPATION: 0
EYE DISCHARGE: 0
BACK PAIN: 0
SINUS PRESSURE: 0
EYE PAIN: 0
ABDOMINAL DISTENTION: 0

## 2023-08-17 ASSESSMENT — PATIENT HEALTH QUESTIONNAIRE - PHQ9
SUM OF ALL RESPONSES TO PHQ9 QUESTIONS 1 & 2: 2
2. FEELING DOWN, DEPRESSED OR HOPELESS: 1
SUM OF ALL RESPONSES TO PHQ QUESTIONS 1-9: 2
1. LITTLE INTEREST OR PLEASURE IN DOING THINGS: 1

## 2023-08-17 NOTE — PROGRESS NOTES
There is abdominal tenderness in the right upper quadrant, epigastric area and left upper quadrant. There is no rebound. Genitourinary:     Penis: No tenderness. Musculoskeletal:         General: No deformity. Normal range of motion. Cervical back: Neck supple. Lymphadenopathy:      Cervical: No cervical adenopathy. Skin:     General: Skin is warm and dry. Findings: No erythema or rash. Neurological:      General: No focal deficit present. Mental Status: He is alert and oriented to person, place, and time. Mental status is at baseline. Psychiatric:         Mood and Affect: Mood normal.         Thought Content: Thought content normal.         Judgment: Judgment normal.       ASSESSMENT/PLAN:  1. Epigastric pain  Persistent  Suspect gastritis/PUD  Resume PPI and carafate  Anti reflux diet  Refer to GI- patient prefers [facility] for scopes due to location  Er records reviewed  - pantoprazole (PROTONIX) 40 MG tablet; Take 1 tablet by mouth every morning (before breakfast)  Dispense: 90 tablet; Refill: 0  - sucralfate (CARAFATE) 1 GM tablet; Take 1 tablet by mouth 4 times daily  Dispense: 120 tablet; Refill: 0  - External Referral To Gastroenterology    2. PUD (peptic ulcer disease)    3. Intractable nausea and vomiting  - ondansetron (ZOFRAN-ODT) 4 MG disintegrating tablet; Take 1 tablet by mouth 3 times daily as needed for Nausea or Vomiting  Dispense: 30 tablet; Refill: 0  - External Referral To Gastroenterology          Return if symptoms worsen or fail to improve.

## 2023-11-16 ENCOUNTER — OFFICE VISIT (OUTPATIENT)
Dept: PRIMARY CARE CLINIC | Age: 58
End: 2023-11-16
Payer: COMMERCIAL

## 2023-11-16 VITALS
HEART RATE: 78 BPM | OXYGEN SATURATION: 98 % | SYSTOLIC BLOOD PRESSURE: 120 MMHG | TEMPERATURE: 98.7 F | BODY MASS INDEX: 29.84 KG/M2 | DIASTOLIC BLOOD PRESSURE: 70 MMHG | WEIGHT: 208 LBS

## 2023-11-16 DIAGNOSIS — J01.00 ACUTE NON-RECURRENT MAXILLARY SINUSITIS: Primary | ICD-10-CM

## 2023-11-16 PROCEDURE — 99213 OFFICE O/P EST LOW 20 MIN: CPT

## 2023-11-16 PROCEDURE — 96372 THER/PROPH/DIAG INJ SC/IM: CPT

## 2023-11-16 RX ORDER — AMOXICILLIN AND CLAVULANATE POTASSIUM 875; 125 MG/1; MG/1
1 TABLET, FILM COATED ORAL 2 TIMES DAILY
Qty: 14 TABLET | Refills: 0 | Status: SHIPPED | OUTPATIENT
Start: 2023-11-16 | End: 2023-11-23

## 2023-11-16 RX ORDER — DEXAMETHASONE SODIUM PHOSPHATE 10 MG/ML
10 INJECTION INTRAMUSCULAR; INTRAVENOUS ONCE
Status: COMPLETED | OUTPATIENT
Start: 2023-11-16 | End: 2023-11-16

## 2023-11-16 RX ORDER — FLUTICASONE PROPIONATE 50 MCG
2 SPRAY, SUSPENSION (ML) NASAL DAILY
Qty: 16 G | Refills: 0 | Status: SHIPPED | OUTPATIENT
Start: 2023-11-16

## 2023-11-16 RX ADMIN — DEXAMETHASONE SODIUM PHOSPHATE 10 MG: 10 INJECTION INTRAMUSCULAR; INTRAVENOUS at 16:36

## 2023-11-16 ASSESSMENT — ENCOUNTER SYMPTOMS
COUGH: 0
SORE THROAT: 0
WHEEZING: 0
NAUSEA: 0
ABDOMINAL PAIN: 0
SINUS PRESSURE: 1
VOMITING: 0
RHINORRHEA: 1

## 2023-11-16 NOTE — PROGRESS NOTES
[facility] [address] [facility]  Dept: [phone]  Dept Fax: [phone]  Loc: [phone]    Juan R Patterson is a 66 y.o. male who presents today for his medical conditions/complaints as noted below. Juan R Patterson is c/o of Sinusitis        HPI:     Juan R Patterson presents with complaints of sinus pressure, postnasal drainage, runny nose, and ear pressure. Denies any sore throat, body aches or fever. Symptoms began 3-4 days ago. Denies any OTC treatment. Denies recent antibiotics and steroids. Denies recent covid19 infection. Past Medical History:   Diagnosis Date    Allergic rhinitis      Past Surgical History:   Procedure Laterality Date    BACK SURGERY      FOOT SURGERY Right     HERNIA REPAIR  2010    abdominal     KIDNEY STONE REMOVAL         History reviewed. No pertinent family history. Social History     Tobacco Use    Smoking status: Never    Smokeless tobacco: Never   Substance Use Topics    Alcohol use: Never      Current Outpatient Medications   Medication Sig Dispense Refill    amoxicillin-clavulanate (AUGMENTIN) 875-125 MG per tablet Take 1 tablet by mouth 2 times daily for 7 days 14 tablet 0    fluticasone (FLONASE) 50 MCG/ACT nasal spray 2 sprays by Each Nostril route daily 16 g 0    ondansetron (ZOFRAN-ODT) 4 MG disintegrating tablet Take 1 tablet by mouth 3 times daily as needed for Nausea or Vomiting 30 tablet 0    loratadine (CLARITIN) 10 MG capsule Take 1 capsule by mouth daily       No current facility-administered medications for this visit.      No Known Allergies    Health Maintenance   Topic Date Due    Hepatitis B vaccine (1 of 3 - 3-dose series) Never done    COVID-19 Vaccine (1) Never done    HIV screen  Never done    Hepatitis C screen  Never done    DTaP/Tdap/Td vaccine (1 - Tdap) Never done    Colorectal Cancer Screen  Never done    Shingles vaccine (1 of 2) Never done    Flu vaccine (1) Never done

## 2023-11-16 NOTE — PATIENT INSTRUCTIONS
Steroid injection administered during clinic visit. Start antibiotic and Flonase, take as directed. Continue OTC Zyrtec/Claritin. Rest.  Maintain adequate fluid intake. Cool mist humidifier. Return to the clinic or follow up with PCP if symptoms worsen or fail to improve.
144.78

## 2024-07-01 ENCOUNTER — OFFICE VISIT (OUTPATIENT)
Dept: FAMILY MEDICINE CLINIC | Age: 59
End: 2024-07-01
Payer: COMMERCIAL

## 2024-07-01 VITALS
HEIGHT: 70 IN | BODY MASS INDEX: 30.92 KG/M2 | SYSTOLIC BLOOD PRESSURE: 122 MMHG | TEMPERATURE: 98.8 F | HEART RATE: 81 BPM | DIASTOLIC BLOOD PRESSURE: 70 MMHG | WEIGHT: 216 LBS | OXYGEN SATURATION: 98 %

## 2024-07-01 DIAGNOSIS — N20.0 KIDNEY STONE: Primary | ICD-10-CM

## 2024-07-01 PROCEDURE — 99213 OFFICE O/P EST LOW 20 MIN: CPT | Performed by: NURSE PRACTITIONER

## 2024-07-01 RX ORDER — CIPROFLOXACIN 500 MG/1
500 TABLET, FILM COATED ORAL DAILY
COMMUNITY

## 2024-07-01 RX ORDER — TAMSULOSIN HYDROCHLORIDE 0.4 MG/1
0.4 CAPSULE ORAL DAILY
COMMUNITY

## 2024-07-01 RX ORDER — OXYCODONE AND ACETAMINOPHEN 7.5; 325 MG/1; MG/1
1 TABLET ORAL EVERY 6 HOURS PRN
COMMUNITY

## 2024-07-01 SDOH — ECONOMIC STABILITY: FOOD INSECURITY: WITHIN THE PAST 12 MONTHS, YOU WORRIED THAT YOUR FOOD WOULD RUN OUT BEFORE YOU GOT MONEY TO BUY MORE.: NEVER TRUE

## 2024-07-01 SDOH — ECONOMIC STABILITY: HOUSING INSECURITY
IN THE LAST 12 MONTHS, WAS THERE A TIME WHEN YOU DID NOT HAVE A STEADY PLACE TO SLEEP OR SLEPT IN A SHELTER (INCLUDING NOW)?: NO

## 2024-07-01 SDOH — ECONOMIC STABILITY: FOOD INSECURITY: WITHIN THE PAST 12 MONTHS, THE FOOD YOU BOUGHT JUST DIDN'T LAST AND YOU DIDN'T HAVE MONEY TO GET MORE.: NEVER TRUE

## 2024-07-01 SDOH — ECONOMIC STABILITY: INCOME INSECURITY: HOW HARD IS IT FOR YOU TO PAY FOR THE VERY BASICS LIKE FOOD, HOUSING, MEDICAL CARE, AND HEATING?: NOT HARD AT ALL

## 2024-07-01 ASSESSMENT — ENCOUNTER SYMPTOMS
VOMITING: 1
NAUSEA: 1

## 2024-07-01 ASSESSMENT — PATIENT HEALTH QUESTIONNAIRE - PHQ9
SUM OF ALL RESPONSES TO PHQ9 QUESTIONS 1 & 2: 0
SUM OF ALL RESPONSES TO PHQ QUESTIONS 1-9: 0
2. FEELING DOWN, DEPRESSED OR HOPELESS: NOT AT ALL
1. LITTLE INTEREST OR PLEASURE IN DOING THINGS: NOT AT ALL

## 2024-07-01 NOTE — PROGRESS NOTES
SUBJECTIVE:  [facility] ER for kidney stone   Patient ID: Skip Wei is a 58 y.o. male.    HPI:   HPI   Needing a referral for Kidney stone . 6 mm. On the Left new stone Eric is out of town.   Seen in the ER on Friday 6/28/2024 on medication but not doing any better.   Not feeling any better this is the thrid round of kidney stones.   Has had kidney stones in the past the right stonce  tried to have it busted but unsuccessful and had to put a stint. 2011. This one was 10 mm. Right Kidney    Past Medical History:   Diagnosis Date    Allergic rhinitis       Prior to Visit Medications    Medication Sig Taking? Authorizing Provider   tamsulosin (FLOMAX) 0.4 MG capsule Take 1 capsule by mouth daily Yes Provider, MD Abdifatah   ciprofloxacin (CIPRO) 500 MG tablet Take 1 tablet by mouth daily Yes Provider, MD Abdifatah   oxyCODONE-acetaminophen (PERCOCET) 7.5-325 MG per tablet Take 1 tablet by mouth every 6 hours as needed for Pain. Max Daily Amount: 4 tablets Yes ProviderAbdifatah MD   ondansetron (ZOFRAN-ODT) 4 MG disintegrating tablet Take 1 tablet by mouth 3 times daily as needed for Nausea or Vomiting Yes Pinky Vaz APRN   loratadine (CLARITIN) 10 MG capsule Take 1 capsule by mouth daily Yes ProviderAbdifatah MD   fluticasone (FLONASE) 50 MCG/ACT nasal spray 2 sprays by Each Nostril route daily  Patient not taking: Reported on 7/1/2024  Jordyn Ahmadi APRN - CNP      No Known Allergies    Review of Systems   Constitutional:  Negative for fever.   Gastrointestinal:  Positive for nausea and vomiting.   Genitourinary:  Positive for difficulty urinating, dysuria, frequency and hematuria.       OBJECTIVE:    Physical Exam  Constitutional:       Appearance: Normal appearance. He is well-developed. He is not ill-appearing.   HENT:      Head: Normocephalic and atraumatic.      Right Ear: External ear normal.      Left Ear: External ear normal.      Nose: Nose normal.      Mouth/Throat:      Lips:

## 2024-09-05 ENCOUNTER — HOSPITAL ENCOUNTER (OUTPATIENT)
Dept: GENERAL RADIOLOGY | Age: 59
Discharge: HOME | End: 2024-09-05
Payer: COMMERCIAL

## 2024-09-05 ENCOUNTER — OFFICE VISIT (OUTPATIENT)
Dept: FAMILY MEDICINE CLINIC | Age: 59
End: 2024-09-05
Payer: COMMERCIAL

## 2024-09-05 VITALS
WEIGHT: 214 LBS | HEART RATE: 80 BPM | SYSTOLIC BLOOD PRESSURE: 118 MMHG | BODY MASS INDEX: 30.64 KG/M2 | HEIGHT: 70 IN | DIASTOLIC BLOOD PRESSURE: 74 MMHG | OXYGEN SATURATION: 96 % | TEMPERATURE: 98.9 F

## 2024-09-05 DIAGNOSIS — M25.511 ACUTE PAIN OF RIGHT SHOULDER: ICD-10-CM

## 2024-09-05 DIAGNOSIS — J30.2 SEASONAL ALLERGIES: Primary | ICD-10-CM

## 2024-09-05 PROCEDURE — 96372 THER/PROPH/DIAG INJ SC/IM: CPT | Performed by: NURSE PRACTITIONER

## 2024-09-05 PROCEDURE — 73030 X-RAY EXAM OF SHOULDER: CPT

## 2024-09-05 PROCEDURE — 99213 OFFICE O/P EST LOW 20 MIN: CPT | Performed by: NURSE PRACTITIONER

## 2024-09-05 RX ORDER — MELOXICAM 15 MG/1
15 TABLET ORAL DAILY
Qty: 30 TABLET | Refills: 3 | Status: SHIPPED | OUTPATIENT
Start: 2024-09-05

## 2024-09-05 RX ORDER — TRIAMCINOLONE ACETONIDE 40 MG/ML
40 INJECTION, SUSPENSION INTRA-ARTICULAR; INTRAMUSCULAR ONCE
Status: COMPLETED | OUTPATIENT
Start: 2024-09-05 | End: 2024-09-05

## 2024-09-05 RX ORDER — MONTELUKAST SODIUM 10 MG/1
10 TABLET ORAL NIGHTLY
Qty: 90 TABLET | Refills: 1 | Status: SHIPPED | OUTPATIENT
Start: 2024-09-05

## 2024-09-05 RX ORDER — DEXAMETHASONE SODIUM PHOSPHATE 10 MG/ML
4 INJECTION INTRAMUSCULAR; INTRAVENOUS ONCE
Status: COMPLETED | OUTPATIENT
Start: 2024-09-05 | End: 2024-09-05

## 2024-09-05 RX ADMIN — TRIAMCINOLONE ACETONIDE 40 MG: 40 INJECTION, SUSPENSION INTRA-ARTICULAR; INTRAMUSCULAR at 08:12

## 2024-09-05 RX ADMIN — DEXAMETHASONE SODIUM PHOSPHATE 4 MG: 10 INJECTION INTRAMUSCULAR; INTRAVENOUS at 08:12

## 2024-09-05 ASSESSMENT — ENCOUNTER SYMPTOMS
SINUS PRESSURE: 1
RHINORRHEA: 1

## 2024-09-05 NOTE — PROGRESS NOTES
After obtaining consent, and per orders of Dr. Gavi douglass, injection of kenalog and decadron was given in the Right upper quad. gluteus . Patient tolerated it well. Patient instructed to report any adverse reaction to me immediately.

## 2024-09-05 NOTE — PROGRESS NOTES
SUBJECTIVE:  Shoulder and sinus  Patient ID: Skip Wei is a 58 y.o. male.    HPI:   HPI   Allergies: all summer long Taking Zyrtec and benadryl helps a little bit Using flonase.   Then stopped up and sneezing runny nose.   Left  shoulder thought slept on it wrong.   Can only lift shoulder height. Limited ROM.   Repetitive motions fishing. Also drives a truck with left arm.   Pain is located at the bicep insertion   Past Medical History:   Diagnosis Date    Allergic rhinitis       Prior to Visit Medications    Medication Sig Taking? Authorizing Provider   montelukast (SINGULAIR) 10 MG tablet Take 1 tablet by mouth nightly Yes Gavi Head APRN   meloxicam (MOBIC) 15 MG tablet Take 1 tablet by mouth daily Yes Gavi Head APRN   ondansetron (ZOFRAN-ODT) 4 MG disintegrating tablet Take 1 tablet by mouth 3 times daily as needed for Nausea or Vomiting Yes Pinky Vaz APRN   loratadine (CLARITIN) 10 MG capsule Take 1 capsule by mouth daily Yes Abdifatah Cheung MD   tamsulosin (FLOMAX) 0.4 MG capsule Take 1 capsule by mouth daily  Patient not taking: Reported on 9/5/2024  Abdifatah Cheung MD   ciprofloxacin (CIPRO) 500 MG tablet Take 1 tablet by mouth daily  Patient not taking: Reported on 9/5/2024  Abdifatah Cheung MD   oxyCODONE-acetaminophen (PERCOCET) 7.5-325 MG per tablet Take 1 tablet by mouth every 6 hours as needed for Pain. Max Daily Amount: 4 tablets  Patient not taking: Reported on 9/5/2024  Abdifatah Cheung MD   fluticasone (FLONASE) 50 MCG/ACT nasal spray 2 sprays by Each Nostril route daily  Patient not taking: Reported on 7/1/2024  Jordyn Ahmadi APRN - CNP      No Known Allergies    Review of Systems   Constitutional:  Negative for fever.   HENT:  Positive for congestion, rhinorrhea and sinus pressure.    Musculoskeletal:  Positive for arthralgias and myalgias.       OBJECTIVE:    Physical Exam  Constitutional:       Appearance: Normal appearance. He is

## 2025-03-07 DIAGNOSIS — J30.2 SEASONAL ALLERGIES: ICD-10-CM

## 2025-03-07 RX ORDER — MONTELUKAST SODIUM 10 MG/1
10 TABLET ORAL NIGHTLY
Qty: 90 TABLET | Refills: 1 | Status: SHIPPED | OUTPATIENT
Start: 2025-03-07

## 2025-03-07 NOTE — TELEPHONE ENCOUNTER
Skip Wei called to request a refill on his medication.      Last office visit : 9/5/2024  Next office visit : Visit date not found     Requested Prescriptions     Pending Prescriptions Disp Refills    montelukast (SINGULAIR) 10 MG tablet [Pharmacy Med Name: MONTELUKAST SOD 10 MG TABLET] 90 tablet 1     Sig: TAKE 1 TABLET BY MOUTH EVERY DAY AT NIGHT            Gavi Mckeon LPN

## 2025-04-21 ENCOUNTER — OFFICE VISIT (OUTPATIENT)
Dept: PRIMARY CARE CLINIC | Age: 60
End: 2025-04-21
Payer: COMMERCIAL

## 2025-04-21 VITALS
OXYGEN SATURATION: 98 % | TEMPERATURE: 98.8 F | DIASTOLIC BLOOD PRESSURE: 76 MMHG | HEART RATE: 75 BPM | WEIGHT: 222 LBS | SYSTOLIC BLOOD PRESSURE: 120 MMHG | BODY MASS INDEX: 31.85 KG/M2

## 2025-04-21 DIAGNOSIS — J02.9 SORE THROAT: ICD-10-CM

## 2025-04-21 DIAGNOSIS — J02.0 STREP PHARYNGITIS: Primary | ICD-10-CM

## 2025-04-21 LAB — S PYO AG THROAT QL: POSITIVE

## 2025-04-21 PROCEDURE — 99213 OFFICE O/P EST LOW 20 MIN: CPT | Performed by: NURSE PRACTITIONER

## 2025-04-21 PROCEDURE — 87880 STREP A ASSAY W/OPTIC: CPT | Performed by: NURSE PRACTITIONER

## 2025-04-21 ASSESSMENT — ENCOUNTER SYMPTOMS
COUGH: 0
SORE THROAT: 1
CHEST TIGHTNESS: 0
WHEEZING: 0
ABDOMINAL DISTENTION: 0
EYE PAIN: 0
SHORTNESS OF BREATH: 0
SINUS PRESSURE: 0
TROUBLE SWALLOWING: 0
ABDOMINAL PAIN: 0
STRIDOR: 0
EYE DISCHARGE: 0
COLOR CHANGE: 0
NAUSEA: 1

## 2025-04-21 NOTE — PROGRESS NOTES
[facility]  [facility] [address]  [address]  [place] [address]  Dept: [phone]  Dept Fax: [phone]  Loc: [phone]    Skip Wei is a 59 y.o. male who presents today for his medical conditions/complaints as noted below.  Skip Wei is complaining of Drainage and Nausea        HPI:   HPI    Skip presents to the office complaining of drainage, sore throat, and nausea.  Symptoms started 4-5 days ago.  Denies fever, cough,  and vomiting.  Denies recent abx.   Past Medical History:   Diagnosis Date    Allergic rhinitis        Past Surgical History:   Procedure Laterality Date    BACK SURGERY      FOOT SURGERY Right     HERNIA REPAIR  2010    abdominal     KIDNEY STONE REMOVAL         No family history on file.    Social History     Tobacco Use    Smoking status: Never    Smokeless tobacco: Never   Substance Use Topics    Alcohol use: Never        Current Outpatient Medications   Medication Sig Dispense Refill    amoxicillin-clavulanate (AUGMENTIN) 875-125 MG per tablet Take 1 tablet by mouth 2 times daily for 10 days 20 tablet 0    montelukast (SINGULAIR) 10 MG tablet TAKE 1 TABLET BY MOUTH EVERY DAY AT NIGHT 90 tablet 1    meloxicam (MOBIC) 15 MG tablet Take 1 tablet by mouth daily 30 tablet 3    ondansetron (ZOFRAN-ODT) 4 MG disintegrating tablet Take 1 tablet by mouth 3 times daily as needed for Nausea or Vomiting 30 tablet 0    loratadine (CLARITIN) 10 MG capsule Take 1 capsule by mouth daily      tamsulosin (FLOMAX) 0.4 MG capsule Take 1 capsule by mouth daily (Patient not taking: Reported on 4/21/2025)      ciprofloxacin (CIPRO) 500 MG tablet Take 1 tablet by mouth daily (Patient not taking: Reported on 4/21/2025)      oxyCODONE-acetaminophen (PERCOCET) 7.5-325 MG per tablet Take 1 tablet by mouth every 6 hours as needed for Pain. Max Daily Amount: 4 tablets (Patient not taking: Reported on 4/21/2025)      fluticasone (FLONASE) 50 MCG/ACT nasal

## 2025-04-21 NOTE — PATIENT INSTRUCTIONS
Encourage fluids, Tylenol/Ibuprofen, OTC decongestants   Strep positive  Antibiotic sent to pharmacy take with probiotic.  Change toothbrush after 24 hours on antibiotics.  If symptoms worsen or fail to improve follow-up with office or PCP  If SOB, chest pain, or high persistent fevers occur, go to ER    Patient verbalized understanding and agrees to plan